# Patient Record
(demographics unavailable — no encounter records)

---

## 2024-12-26 NOTE — HISTORY OF PRESENT ILLNESS
[None] : No associated symptoms are reported [CPAP] : CPAP [Good Compliance] : good compliance with treatment [Good Tolerance] : good tolerance of treatment [Good Symptom Control] : good symptom control

## 2024-12-26 NOTE — ASSESSMENT
[FreeTextEntry1] : NEGRITA on CPAP 6 cm H2O.   RLS compensated on Requip 3 mg daily. Post viral URI resolved